# Patient Record
Sex: FEMALE | NOT HISPANIC OR LATINO | Employment: OTHER | URBAN - METROPOLITAN AREA
[De-identification: names, ages, dates, MRNs, and addresses within clinical notes are randomized per-mention and may not be internally consistent; named-entity substitution may affect disease eponyms.]

---

## 2024-02-02 ENCOUNTER — LAB (OUTPATIENT)
Dept: URBAN - METROPOLITAN AREA CLINIC 68 | Facility: CLINIC | Age: 79
End: 2024-02-02

## 2024-02-02 ENCOUNTER — OV NP (OUTPATIENT)
Dept: URBAN - METROPOLITAN AREA CLINIC 68 | Facility: CLINIC | Age: 79
End: 2024-02-02
Payer: COMMERCIAL

## 2024-02-02 VITALS — BODY MASS INDEX: 31.8 KG/M2 | WEIGHT: 162 LBS | HEIGHT: 60 IN

## 2024-02-02 DIAGNOSIS — R10.13 DYSPEPSIA: ICD-10-CM

## 2024-02-02 DIAGNOSIS — B18.2 CHRONIC HEPATITIS C WITHOUT HEPATIC COMA: ICD-10-CM

## 2024-02-02 DIAGNOSIS — K74.69 OTHER CIRRHOSIS OF LIVER: ICD-10-CM

## 2024-02-02 PROCEDURE — 99204 OFFICE O/P NEW MOD 45 MIN: CPT | Performed by: INTERNAL MEDICINE

## 2024-02-02 RX ORDER — METOPROLOL SUCCINATE ER TABLETS 50 MG/1
TAKE ONE TABLET BY MOUTH TWICE A DAY TABLET, FILM COATED, EXTENDED RELEASE ORAL
Qty: 180 UNSPECIFIED | Refills: 0 | Status: ACTIVE | COMMUNITY

## 2024-02-02 RX ORDER — PANTOPRAZOLE SODIUM 40 MG/1
TAKE ONE TABLET BY MOUTH ONE TIME DAILY TABLET, DELAYED RELEASE ORAL
Qty: 90 UNSPECIFIED | Refills: 0 | Status: ACTIVE | COMMUNITY

## 2024-02-02 RX ORDER — NITROGLYCERIN 0.4 MG/1
AS DIRECTED TABLET SUBLINGUAL
Status: ACTIVE | COMMUNITY

## 2024-02-02 RX ORDER — ALPRAZOLAM 0.25 MG/1
1 TABLET TABLET ORAL TWICE A DAY
Status: ACTIVE | COMMUNITY

## 2024-02-02 RX ORDER — LEVOTHYROXINE SODIUM 100 UG/1
TAKE ONE TABLET BY MOUTH ONE TIME DAILY TABLET ORAL
Qty: 90 UNSPECIFIED | Refills: 0 | Status: ACTIVE | COMMUNITY

## 2024-02-02 RX ORDER — FAMOTIDINE 20 MG/1
1 TABLET TABLET, FILM COATED ORAL ONCE A DAY
Qty: 90 TABLET | OUTPATIENT
Start: 2024-02-02

## 2024-02-02 RX ORDER — FAMOTIDINE 20 MG/1
1 TABLET AT BEDTIME AS NEEDED TABLET, FILM COATED ORAL TWICE A DAY
Status: ACTIVE | COMMUNITY

## 2024-02-02 RX ORDER — FLUTICASONE FUROATE AND VILANTEROL TRIFENATATE 200; 25 UG/1; UG/1
INHALE ONE PUFF BY MOUTH ONE TIME DAILY POWDER RESPIRATORY (INHALATION)
Qty: 60 UNSPECIFIED | Refills: 0 | Status: ACTIVE | COMMUNITY

## 2024-02-02 RX ORDER — MEPOLIZUMAB 100 MG/ML
AS DIRECTED INJECTION, POWDER, FOR SOLUTION SUBCUTANEOUS
Status: ACTIVE | COMMUNITY

## 2024-02-02 RX ORDER — ALBUTEROL SULFATE 90 UG/1
INHALE TWO PUFFS BY MOUTH EVERY 4 HOURS AS NEEDED AEROSOL, METERED RESPIRATORY (INHALATION)
Qty: 18 UNSPECIFIED | Refills: 0 | Status: ACTIVE | COMMUNITY

## 2024-02-02 RX ORDER — SERTRALINE 50 MG/1
1 TABLET TABLET, FILM COATED ORAL ONCE A DAY
Status: ACTIVE | COMMUNITY

## 2024-02-02 NOTE — HPI-TODAY'S VISIT:
Case of a 78-year-old female patient that comes in today for evaluation.  Patient has a pertinent history of cirrhosis contracted secondary to her blood transfusions in the 70s.  She was treated with interferon based therapy.  She obtained sustained virological response.  Unfortunately she developed cirrhosis secondary to hepatitis C virus.  Patient has never had decompensation of her cirrhosis.  She has a pertinent history of significant bouts of dyspepsia burning epigastric pain.  She manages her symptoms with PPI as needed sucralfate daily.  She denies melena hematochezia hematemesis coffee-ground emesis.

## 2024-02-08 LAB
A/G RATIO: 1.5
ABSOLUTE BASOPHILS: 22
ABSOLUTE EOSINOPHILS: 28
ABSOLUTE LYMPHOCYTES: 2134
ABSOLUTE MONOCYTES: 504
ABSOLUTE NEUTROPHILS: 2912
ALBUMIN: 4.4
ALKALINE PHOSPHATASE: 91
ALT (SGPT): 12
AST (SGOT): 23
BASOPHILS: 0.4
BILIRUBIN, TOTAL: 0.6
BUN/CREATININE RATIO: (no result)
BUN: 14
CALCIUM: 9.6
CARBON DIOXIDE, TOTAL: 25
CHLORIDE: 102
CREATININE: 0.75
EGFR: 81
EOSINOPHILS: 0.5
GLOBULIN, TOTAL: 3
GLUCOSE: 90
HCV RNA, QUANTITATIVE REAL TIME PCR: <1.18
HCV RNA, QUANTITATIVE REAL TIME PCR: <15
HEMATOCRIT: 40.8
HEMOGLOBIN: 13.6
INR: 1
LYMPHOCYTES: 38.1
MCH: 30.4
MCHC: 33.3
MCV: 91.1
MONOCYTES: 9
MPV: 10.8
NEUTROPHILS: 52
PLATELET COUNT: 169
POTASSIUM: 3.8
PROTEIN, TOTAL: 7.4
PT: 10.8
RDW: 12.6
RED BLOOD CELL COUNT: 4.48
SODIUM: 138
WHITE BLOOD CELL COUNT: 5.6

## 2024-02-20 ENCOUNTER — US ABD/PEL (OUTPATIENT)
Dept: URBAN - METROPOLITAN AREA CLINIC 67 | Facility: CLINIC | Age: 79
End: 2024-02-20
Payer: COMMERCIAL

## 2024-02-20 DIAGNOSIS — Z90.49 ACQUIRED ABSENCE OF OTHER SPECIFIED PARTS OF DIGESTIVE TRACT: ICD-10-CM

## 2024-02-20 DIAGNOSIS — K76.0 FATTY (CHANGE OF) LIVER, NOT ELSEWHERE CLASSIFIED: ICD-10-CM

## 2024-02-20 DIAGNOSIS — K74.60 CIRRHOSIS OF LIVER WITHOUT ASCITES, UNSPECIFIED HEPATIC CIRRHOSIS TYPE: ICD-10-CM

## 2024-02-20 PROCEDURE — 93976 VASCULAR STUDY: CPT | Performed by: INTERNAL MEDICINE

## 2024-02-20 PROCEDURE — 76705 ECHO EXAM OF ABDOMEN: CPT | Performed by: INTERNAL MEDICINE

## 2024-02-26 ENCOUNTER — LAB (OUTPATIENT)
Dept: URBAN - METROPOLITAN AREA CLINIC 68 | Facility: CLINIC | Age: 79
End: 2024-02-26

## 2024-02-27 ENCOUNTER — FIBROSCAN (OUTPATIENT)
Dept: URBAN - METROPOLITAN AREA CLINIC 67 | Facility: CLINIC | Age: 79
End: 2024-02-27

## 2024-02-27 PROBLEM — 197321007: Status: ACTIVE | Noted: 2024-02-26

## 2024-02-29 ENCOUNTER — LAB (OUTPATIENT)
Dept: URBAN - METROPOLITAN AREA CLINIC 68 | Facility: CLINIC | Age: 79
End: 2024-02-29

## 2024-02-29 ENCOUNTER — OV EP (OUTPATIENT)
Dept: URBAN - METROPOLITAN AREA CLINIC 68 | Facility: CLINIC | Age: 79
End: 2024-02-29

## 2024-02-29 VITALS
DIASTOLIC BLOOD PRESSURE: 88 MMHG | WEIGHT: 162 LBS | BODY MASS INDEX: 31.8 KG/M2 | HEIGHT: 60 IN | SYSTOLIC BLOOD PRESSURE: 128 MMHG

## 2024-02-29 PROBLEM — 128302006: Status: ACTIVE | Noted: 2024-01-30

## 2024-02-29 PROBLEM — 19943007: Status: ACTIVE | Noted: 2024-02-02

## 2024-02-29 PROBLEM — 162031009: Status: ACTIVE | Noted: 2024-02-02

## 2024-02-29 RX ORDER — NITROGLYCERIN 0.4 MG/1
AS DIRECTED TABLET SUBLINGUAL
Status: ACTIVE | COMMUNITY

## 2024-02-29 RX ORDER — ALBUTEROL SULFATE 90 UG/1
INHALE TWO PUFFS BY MOUTH EVERY 4 HOURS AS NEEDED AEROSOL, METERED RESPIRATORY (INHALATION)
Qty: 18 UNSPECIFIED | Refills: 0 | Status: ACTIVE | COMMUNITY

## 2024-02-29 RX ORDER — FAMOTIDINE 20 MG/1
1 TABLET TABLET, FILM COATED ORAL ONCE A DAY
Qty: 90 TABLET | Status: ACTIVE | COMMUNITY
Start: 2024-02-02

## 2024-02-29 RX ORDER — METOPROLOL SUCCINATE ER TABLETS 50 MG/1
TAKE ONE TABLET BY MOUTH TWICE A DAY TABLET, FILM COATED, EXTENDED RELEASE ORAL
Qty: 180 UNSPECIFIED | Refills: 0 | Status: ACTIVE | COMMUNITY

## 2024-02-29 RX ORDER — SERTRALINE 50 MG/1
1 TABLET TABLET, FILM COATED ORAL ONCE A DAY
Status: ACTIVE | COMMUNITY

## 2024-02-29 RX ORDER — ALPRAZOLAM 0.25 MG/1
1 TABLET TABLET ORAL TWICE A DAY
Status: ACTIVE | COMMUNITY

## 2024-02-29 RX ORDER — PANTOPRAZOLE SODIUM 40 MG/1
TAKE ONE TABLET BY MOUTH ONE TIME DAILY TABLET, DELAYED RELEASE ORAL
Qty: 90 UNSPECIFIED | Refills: 0 | Status: ACTIVE | COMMUNITY

## 2024-02-29 RX ORDER — FAMOTIDINE 20 MG/1
1 TABLET AT BEDTIME AS NEEDED TABLET, FILM COATED ORAL TWICE A DAY
Status: ACTIVE | COMMUNITY

## 2024-02-29 RX ORDER — FLUTICASONE FUROATE AND VILANTEROL TRIFENATATE 200; 25 UG/1; UG/1
INHALE ONE PUFF BY MOUTH ONE TIME DAILY POWDER RESPIRATORY (INHALATION)
Qty: 60 UNSPECIFIED | Refills: 0 | Status: ACTIVE | COMMUNITY

## 2024-02-29 RX ORDER — MEPOLIZUMAB 100 MG/ML
AS DIRECTED INJECTION, POWDER, FOR SOLUTION SUBCUTANEOUS
Status: ACTIVE | COMMUNITY

## 2024-02-29 RX ORDER — LEVOTHYROXINE SODIUM 100 UG/1
TAKE ONE TABLET BY MOUTH ONE TIME DAILY TABLET ORAL
Qty: 90 UNSPECIFIED | Refills: 0 | Status: ACTIVE | COMMUNITY

## 2024-02-29 NOTE — HPI-TODAY'S VISIT:
Case of a 78-year-old female patient that comes in today for evaluation.  Patient has a pertinent history of HCV  contracted secondary to her blood transfusions in the 70s.  She was treated with interferon based therapy.  She obtained sustained virological response.  She had been toild that she had developed cirrhosis secondary to hepatitis C virus.  Patient has never had decompensation of her cirrhosis.  Furthermore she has a pertinent history of significant bouts of dyspepsia burning epigastric pain.  She manages her symptoms with PPI as needed sucralfate daily.  She underwent US and liver elastography that goes against cirrhosis. She comes in today for follow up. She continues with above symtoms. She denies melena hematochezia hematemesis coffee-ground emesis.

## 2024-03-28 ENCOUNTER — EGD (OUTPATIENT)
Dept: URBAN - METROPOLITAN AREA SURGERY CENTER 12 | Facility: SURGERY CENTER | Age: 79
End: 2024-03-28
Payer: COMMERCIAL

## 2024-03-28 DIAGNOSIS — K31.89 OTHER DISEASES OF STOMACH AND DUODENUM: ICD-10-CM

## 2024-03-28 DIAGNOSIS — K29.70 GASTRITIS WITHOUT BLEEDING, UNSPECIFIED CHRONICITY, UNSPECIFIED GASTRITIS TYPE: ICD-10-CM

## 2024-03-28 PROCEDURE — 43239 EGD BIOPSY SINGLE/MULTIPLE: CPT | Performed by: INTERNAL MEDICINE

## 2024-03-28 RX ORDER — FLUTICASONE FUROATE AND VILANTEROL TRIFENATATE 200; 25 UG/1; UG/1
INHALE ONE PUFF BY MOUTH ONE TIME DAILY POWDER RESPIRATORY (INHALATION)
Qty: 60 UNSPECIFIED | Refills: 0 | Status: ACTIVE | COMMUNITY

## 2024-03-28 RX ORDER — FAMOTIDINE 20 MG/1
1 TABLET TABLET, FILM COATED ORAL ONCE A DAY
Qty: 90 TABLET | Status: ACTIVE | COMMUNITY
Start: 2024-02-02

## 2024-03-28 RX ORDER — MEPOLIZUMAB 100 MG/ML
AS DIRECTED INJECTION, POWDER, FOR SOLUTION SUBCUTANEOUS
Status: ACTIVE | COMMUNITY

## 2024-03-28 RX ORDER — PANTOPRAZOLE SODIUM 40 MG/1
TAKE ONE TABLET BY MOUTH ONE TIME DAILY TABLET, DELAYED RELEASE ORAL
Qty: 90 UNSPECIFIED | Refills: 0 | Status: ACTIVE | COMMUNITY

## 2024-03-28 RX ORDER — METOPROLOL SUCCINATE ER TABLETS 50 MG/1
TAKE ONE TABLET BY MOUTH TWICE A DAY TABLET, FILM COATED, EXTENDED RELEASE ORAL
Qty: 180 UNSPECIFIED | Refills: 0 | Status: ACTIVE | COMMUNITY

## 2024-03-28 RX ORDER — SERTRALINE 50 MG/1
1 TABLET TABLET, FILM COATED ORAL ONCE A DAY
Status: ACTIVE | COMMUNITY

## 2024-03-28 RX ORDER — ALPRAZOLAM 0.25 MG/1
1 TABLET TABLET ORAL TWICE A DAY
Status: ACTIVE | COMMUNITY

## 2024-03-28 RX ORDER — ALBUTEROL SULFATE 90 UG/1
INHALE TWO PUFFS BY MOUTH EVERY 4 HOURS AS NEEDED AEROSOL, METERED RESPIRATORY (INHALATION)
Qty: 18 UNSPECIFIED | Refills: 0 | Status: ACTIVE | COMMUNITY

## 2024-03-28 RX ORDER — LEVOTHYROXINE SODIUM 100 UG/1
TAKE ONE TABLET BY MOUTH ONE TIME DAILY TABLET ORAL
Qty: 90 UNSPECIFIED | Refills: 0 | Status: ACTIVE | COMMUNITY

## 2024-03-28 RX ORDER — NITROGLYCERIN 0.4 MG/1
AS DIRECTED TABLET SUBLINGUAL
Status: ACTIVE | COMMUNITY

## 2024-03-28 RX ORDER — FAMOTIDINE 20 MG/1
1 TABLET AT BEDTIME AS NEEDED TABLET, FILM COATED ORAL TWICE A DAY
Status: ACTIVE | COMMUNITY

## 2024-04-16 ENCOUNTER — OV EP (OUTPATIENT)
Dept: URBAN - METROPOLITAN AREA CLINIC 68 | Facility: CLINIC | Age: 79
End: 2024-04-16